# Patient Record
(demographics unavailable — no encounter records)

---

## 2021-09-28 NOTE — PHYS DOC
General Pediatric Assessment


History of Present Illness





Patient is a 13-year-old female presenting to the emergency department for 

evaluation of a headache that has been present for the past 2-3 days.  Child 

has had fever for the past 2 days as well with highest temperature being 100.8

.  Child says she has some throat pain and neck pain including photophobia but 

no vomiting.  She does have some decreased appetite.  Child has been 

alternating Tylenol and ibuprofen for fever and pain which has not helped very 

much.  Child is in no obvious distress.


Review of Systems





Constitutional: + fever, chills []


Eyes: Denies change in visual acuity, redness, or eye pain []


HENT: Denies nasal congestion.  + sore throat []


Respiratory: Denies cough or shortness of breath []


Cardiovascular: No additional information not addressed in HPI []


GI: Denies abdominal pain, nausea, vomiting, bloody stools or diarrhea []


: Denies dysuria or hematuria []


Musculoskeletal: Denies back pain or joint pain []


Integument: Denies rash or skin lesions []


Neurologic: + headache.  No focal weakness or sensory changes []


Current Medications





Current Medications








 Medications


  (Trade)  Dose


 Ordered  Sig/[place]  Start Time


 Stop Time Status Last Admin


Dose Admin


 


 Acetaminophen/


 Hydrocodone Bitart


  (Lortab 5/325)  1 tab  1X  ONCE  7/11/17 23:30


 7/11/17 23:31 DC 7/11/17 23:06


1 TAB


 


 Ketorolac


 Tromethamine


  (Toradol)  30 mg  1X  ONCE  7/11/17 23:30


 7/11/17 23:31 DC 7/11/17 23:07


30 MG


 


 Sodium Chloride  1,000 ml @ 


 As Directed  [facility] ONCE  7/11/17 22:58


 7/11/17 22:59 DC  


 








Allergies





Allergies








Coded Allergies Type Severity Reaction Last Updated Verified


 


  No Known Allergies Allergy Unknown  7/11/17 Yes








Physical Exam





Constitutional: Well developed, well nourished, no acute distress, non-toxic 

appearance, positive interaction, playful.


HENT: Normocephalic, atraumatic, bilateral external ears normal, oropharynx 

moist, no oral exudates, nose normal.


Eyes: PERLL, EOMI, conjunctiva normal, no discharge.


Neck: Normal range of motion, no tenderness, supple, negative Kernig's and 

Brudzinski's.


Cardiovascular: Normal heart rate, normal rhythm, no murmurs, no rubs, no 

gallops.


Thorax and Lungs: Normal breath sounds, no respiratory distress, no wheezing, 

no chest tenderness, no retractions, no accessory muscle use.


Abdomen: Bowel sounds normal, soft, no tenderness, no masses, no pulsatile 

masses.


Skin: Warm, dry, no erythema, no rash.


Back: No tenderness, no CVA tenderness.


Extremeties: Intact distal pulses, no tenderness, no cyanosis, no clubbing, ROM 

intact, no edema. 


Musculoskeletal: Good ROM in all major joints, no tenderness to palpation or 

major deformities noted. 


Neurologic: Alert and oriented X 3, normal motor function, normal sensory 

function, no focal deficits noted.


Radiology/Procedures


[]


Current Patient Data





Laboratory Tests








Test


  7/11/17


22:55 7/11/17


23:10 7/12/17


00:50


 


White Blood Count


  5.1 x10^3/uL


(4.5-13.5) 


  


 


 


Red Blood Count


  4.80 x10^6/uL


(3.70-5.20) 


  


 


 


Hemoglobin


  14.3 g/dL


(11.5-15.0) 


  


 


 


Hematocrit


  41.2 %


(34.0-44.0) 


  


 


 


Mean Corpuscular Volume 86 fL (80-96)    


 


Mean Corpuscular Hemoglobin 30 pg (23-34)    


 


Mean Corpuscular Hemoglobin


Concent 35 g/dL


(31-37) 


  


 


 


Red Cell Distribution Width


  12.0 %


(11.5-14.5) 


  


 


 


Platelet Count


  172 x10^3/uL


(140-400) 


  


 


 


Neutrophils (%) (Auto) 63 % (31-73)    


 


Lymphocytes (%) (Auto) 23 % (24-48)  L  


 


Monocytes (%) (Auto) 13 % (0-9)  H  


 


Eosinophils (%) (Auto) 0 % (0-3)    


 


Basophils (%) (Auto) 1 % (0-3)    


 


Neutrophils # (Auto)


  3.2 x10^3uL


(1.8-7.7) 


  


 


 


Lymphocytes # (Auto)


  1.2 x10^3/uL


(1.0-4.8) 


  


 


 


Monocytes # (Auto)


  0.7 x10^3/uL


(0.0-1.1) 


  


 


 


Eosinophils # (Auto)


  0.0 x10^3/uL


(0.0-0.7) 


  


 


 


Basophils # (Auto)


  0.0 x10^3/uL


(0.0-0.2) 


  


 


 


Sodium Level


  138 mmol/L


(136-145) 


  


 


 


Potassium Level


  3.7 mmol/L


(3.5-5.1) 


  


 


 


Chloride Level


  103 mmol/L


() 


  


 


 


Carbon Dioxide Level


  26 mmol/L


(22-29) 


  


 


 


Anion Gap 9 (6-14)    


 


Blood Urea Nitrogen


  8 mg/dL (7-20)


  


  


 


 


Creatinine


  0.9 mg/dL


(0.6-1.0) 


  


 


 


Estimated GFR


(Cockcroft-Gault)   


  


  


 


 


BUN/Creatinine Ratio 9 (6-20)    


 


Glucose Level


  105 mg/dL


(60-99)  H 


  


 


 


Calcium Level


  8.7 mg/dL


(8.5-10.1) 


  


 


 


Total Bilirubin


  0.5 mg/dL


(0.2-1.0) 


  


 


 


Aspartate Amino Transf


(AST/SGOT) 38 U/L (15-37)


H 


  


 


 


Alanine Aminotransferase


(ALT/SGPT) 38 U/L (14-59)


  


  


 


 


Alkaline Phosphatase


  130 U/L


(110-470) 


  


 


 


C-Reactive Protein


  30.3 mg/L


(0-3.3)  H 


  


 


 


Total Protein


  7.8 g/dL


(6.4-8.2) 


  


 


 


Albumin


  4.0 g/dL


(3.4-5.0) 


  


 


 


Albumin/Globulin Ratio 1.1 (1.0-1.7)    


 


Group A Streptococcus Rapid


  


  Negative


(NEGATIVE) 


 


 


Urine Collection Type   Unknown  


 


Urine Color   Yellow  


 


Urine Clarity   Clear  


 


Urine pH   6.0  


 


Urine Specific Gravity   <=1.005  


 


Urine Protein


  


  


  Neg


(NEG-TRACE)


 


Urine Glucose (UA)


  


  


  Neg mg/dL


(NEG)


 


Urine Ketones (Stick)


  


  


  Neg mg/dL


(NEG)


 


Urine Blood   Neg (NEG)  


 


Urine Nitrite   Neg (NEG)  


 


Urine Bilirubin   Neg (NEG)  


 


Urine Urobilinogen Dipstick


  


  


  0.2 mg/dL (0.2


mg/dL)


 


Urine Leukocyte Esterase   Neg (NEG)  


 


Urine RBC   0 /HPF (0-2)  


 


Urine WBC


  


  


  Rare /HPF


(0-4)


 


Urine Squamous Epithelial


Cells 


  


  Few /LPF  


 


 


Urine Bacteria


  


  


  0 /HPF (0-FEW)


 








Vital Signs








  Date Time  Temp Pulse Resp B/P (MAP) Pulse Ox O2 Delivery O2 Flow Rate FiO2


 


7/11/17 23:06   20  98   








Vital Signs








  Date Time  Temp Pulse Resp B/P (MAP) Pulse Ox O2 Delivery O2 Flow Rate FiO2


 


7/11/17 23:06   20  98   








Vital Signs








  Date Time  Temp Pulse Resp B/P (MAP) Pulse Ox O2 Delivery O2 Flow Rate FiO2


 


7/11/17 23:06   20  98   








Course & Med Decision Making


Child with nonspecific fever and headache.  I do not suspect bacterial 

meningitis but that seems to be the family's major concerns I recommended 

lumbar puncture.  I explained the procedure to them however they then wanted to 

check labs and think about it.  Child was given fluids Norco and Toradol and 

her headache completely resolved and now she is hungry wanting to go eat 

something.  Neurologic exam is normal and her repeat vital signs are normal as 

well.  Lumbar puncture and they verbalize understanding that I could miss a life

-threatening diagnosis.  Patient will be discharged with instructions to follow 

with her pediatrician later on today and to come back to the ER sooner with any 

worsening pain fevers vomiting or other general concerns.  Parents aware and 

agreeable with plan.





Departure


Departure:


Impression:  


 Primary Impression:  


 Headache


 Additional Impression:  


 Fever


Disposition:  01 HOME, SELF-CARE


Condition:  GOOD


Referrals:  


BRITTANY MCKEON MD (PCP)








LAYLA DALY MD


Patient Instructions:  Headache, FAQs





Additional Instructions:  


TAKE IBUPROFEN EVERY 6 HOURS AND ALTERNATE WITH TYLENOL EVERY 6 HOURS.  FOLLOW 

WITH YOUR PCP LATER ON TODAY FOR RECHECK.





Problem Qualifiers








 Primary Impression:  


 Headache


 Headache type:  unspecified  Headache chronicity pattern:  acute headache  

Intractability:  not intractable  Qualified Codes:  R51 - Headache








DENAE SHARMA DO Jul 12, 2017 01:26 Patient has been scheduled with a PCP:    Dr. Matt Trent [id number]  October 7, 2021 @ 9AM    SW gave information to patient.     Electronically signed by ARTUR Macedo on 9/28/2021 at 1:16 PM